# Patient Record
Sex: FEMALE | Race: WHITE
[De-identification: names, ages, dates, MRNs, and addresses within clinical notes are randomized per-mention and may not be internally consistent; named-entity substitution may affect disease eponyms.]

---

## 2018-03-20 ENCOUNTER — HOSPITAL ENCOUNTER (EMERGENCY)
Dept: HOSPITAL 24 - ER | Age: 33
Discharge: HOME | End: 2018-03-20
Payer: COMMERCIAL

## 2018-03-20 VITALS — BODY MASS INDEX: 27.8 KG/M2

## 2018-03-20 VITALS — SYSTOLIC BLOOD PRESSURE: 135 MMHG | DIASTOLIC BLOOD PRESSURE: 78 MMHG

## 2018-03-20 DIAGNOSIS — S20.211A: Primary | ICD-10-CM

## 2018-03-20 DIAGNOSIS — M19.90: ICD-10-CM
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
## 2018-03-20 DIAGNOSIS — M50.30: ICD-10-CM

## 2018-03-20 LAB
BASOPHILS # BLD AUTO: 0.1 X10^3/UL (ref 0–0.1)
BASOPHILS NFR BLD AUTO: 0.9 % (ref 0.2–1)
BUN SERPL-MCNC: 21 MG/DL (ref 7–18)
CALCIUM ALBUM COR SERPL-SCNC: (no result) MMOL/L (ref 136–145)
CALCIUM SERPL-MCNC: 8.8 MG/DL (ref 8.5–10.1)
CHLORIDE SERPL-SCNC: 104 MMOL/L (ref 98–107)
CO2 SERPL-SCNC: 28.6 MMOL/L (ref 21–32)
CREAT SERPL-MCNC: 0.88 MG/DL (ref 0.55–1.02)
EGFR  BLACK RACES: > 60 (ref 60–?)
EOSINOPHIL # BLD AUTO: 0.4 X10^3/UL (ref 0–0.2)
EOSINOPHIL NFR BLD AUTO: 3.1 % (ref 0.9–2.9)
ERYTHROCYTE [DISTWIDTH] IN BLOOD BY AUTOMATED COUNT: 12.3 % (ref 11.6–16.5)
HCT VFR BLD AUTO: 41.2 % (ref 36–47)
HGB BLD-MCNC: 14.1 G/DL (ref 12–16)
LYMPHOCYTES # BLD AUTO: 3.5 X10^3/UL (ref 1.3–2.9)
LYMPHOCYTES NFR BLD AUTO: 26.8 % (ref 21–51)
MCH RBC QN AUTO: 31 PG (ref 27–34)
MCHC RBC AUTO-ENTMCNC: 34.3 G/DL (ref 33–35)
MCV RBC AUTO: 90.2 FL (ref 80–100)
MONOCYTES # BLD AUTO: 0.9 X10^3/UL (ref 0.3–0.8)
MONOCYTES NFR BLD AUTO: 6.5 % (ref 0–13)
NEUTROPHILS # BLD AUTO: 8.3 X10^3/UL (ref 2.2–4.8)
NEUTROPHILS NFR BLD AUTO: 62.7 % (ref 42–75)
PLATELET # BLD: 328 X10^3/UL (ref 150–450)
PMV BLD AUTO: 7.4 FL (ref 7.4–11)
RBC # BLD AUTO: 4.56 X10^6/UL (ref 3.5–5.4)
SODIUM SERPL-SCNC: 142 MMOL/L (ref 136–145)
WBC NRBC COR # BLD AUTO: 13.2 X10^3/UL (ref 3.6–10)

## 2018-03-20 PROCEDURE — 85025 COMPLETE CBC W/AUTO DIFF WBC: CPT

## 2018-03-20 PROCEDURE — 99283 EMERGENCY DEPT VISIT LOW MDM: CPT

## 2018-03-20 PROCEDURE — 74177 CT ABD & PELVIS W/CONTRAST: CPT

## 2018-03-20 PROCEDURE — 96365 THER/PROPH/DIAG IV INF INIT: CPT

## 2018-03-20 PROCEDURE — 96374 THER/PROPH/DIAG INJ IV PUSH: CPT

## 2018-03-20 PROCEDURE — 36415 COLL VENOUS BLD VENIPUNCTURE: CPT

## 2018-03-20 PROCEDURE — 80048 BASIC METABOLIC PNL TOTAL CA: CPT

## 2018-03-20 PROCEDURE — 96375 TX/PRO/DX INJ NEW DRUG ADDON: CPT

## 2018-03-20 PROCEDURE — 96367 TX/PROPH/DG ADDL SEQ IV INF: CPT

## 2018-03-20 PROCEDURE — 72125 CT NECK SPINE W/O DYE: CPT

## 2018-03-20 NOTE — DR.URINEF
HPI





- Time Seen


Time seen: 11:40





- PCP


Primary Care Physician: MOISÉS ROMANO





- Complaint


Chief Complaint Doctors Comments: Patient fell onto a tool box injured her 

right side. She has been complaining of right side pain and dysuria.


Chief Complaint:: PT C/O FALLING OFF OF A RIP STICK ON 03/19/18 AND SHE C/O 

PAIN TO HER NECK AND RIGHT SHOULDER AND SHE NOTICED BLOOD IN HER UA.. AND THAT 

SHE WENT TO HER MD TODAY AND THEY TOLD HER TO COME HERE..





- Source


History Provided: Patient





- Mode of Arrival


Mode of Arrival: Ambulatory





- Timing


Onset of Chief Complaint: 03/19/18





PMH





- PMH


Past Medical History: Yes


Past Medical History: Hypertension


Past Medical History Comment: > HR


Past Surgical History: Yes


Surgical History: Hysterectomy





- Family History


History of Family Medical Conditions: No


Family Medical History: Diabetes Mellitus, Coronary Artery Disease, Hypertension





- Social History


Does patient currently use any type of tobacco product: Yes


Have you used tobacco products in the last 12 months: Yes


Type of Tobacco Use: Cigarettes


How many years tobacco product used: 1


Does any household member use tobacco: No


Alcohol Use: None


Do you use any recreational Drugs:: No


Lives With: Family


Lives Where: Home





- infectious screening


In the last 2 months have you had wt loss of >10#?: NO


Have you had fever, night sweats or hemotysis?: No


Have you traveled outside the country in the last 6 months?: No


Isolation: Standard





ROS





- Review of Systems


Constitutional: No Symptoms Reported


Eyes: No Symptoms Reported


ENTM: No Symptoms Reported


Respiratoy: No Symptoms Reported


Cardiovascular: No Symptoms Reported


Gastrointestinal/Abdominal: No Symptoms Reported


Genitourinary: No Symptoms Reported


Neurological: No Symptoms Reported


Musculoskeletal: Muscle Pain (right flank pan)


Integumentary: No Symptoms Reported


Hematologic/Lymphatic: No Symptoms Reported


Endocrine: No Symptoms Reported


Psychiatric: No Symptoms Reported


All Other Systems: Reviewed and Negative





PE





- Vital Signs


Vitals: 


 





Temperature                      98.2 F


Pulse Rate                       85


Respiratory Rate                 20


Blood Pressure                   135/78


O2 Sat by Pulse Oximetry         100











- General


General Appearance: Alert, In No Apparent Distress





- Head


Head Exam: Normal Inspection, Atraumatic





- Eyes


Eye exam: Normal Appearance, PERRL, EOMI





- ENT


ENT Exam: Normal Exam





- Neck


Neck Exam: Normal Inspection, Full ROM





- Chest


Chest Inspection: Normal Inspection





- Respiratory


Respiratory Exam: Normal Lung Sounds Bilat


Respiratory Exam: Bilateral Clear to Auscultation





- Cardiovascular


Cardiovascular Exam: Regular Rate, Normal Rhythm





- Abdominal Exam


Abdominal Exam: Normal Inspection


Abdominal Tenderness: negative: RUQ, RLQ, LUQ, LLQ, Epigastrium, Suprapubic, 

Diffuse, Mild, Moderate, Severe, Other





- Rectal


Rectal Exam: Deferred





- Genitourinary


External  Exam: Female: Deferred


: Speculum Exam  (Female): Deferred


: Bimanual Exam (female): Deferred





- Extremities


Extremities Exam: Normal Inspection, Full ROM





- Back


Back Exam: Normal Inspection, Full ROM





- Neurologic


Neurological Exam: Alert, Oriented X3, CN II-XII Intact





- Psychiatric


Psychiatric Exam: Normal Affect





- Skin


Skin Exam: Warm, Dry, Intact





ROR





- Labs Reviewed


Result Diagrams: 


 03/20/18 11:57





 03/20/18 11:57


Laboratory: 


 











WBC  13.2 X10^3/uL (3.6-10.0)  H  03/20/18  11:57    


 


RBC  4.56 X10^6/uL (3.5-5.4)   03/20/18  11:57    


 


Hgb  14.1 g/dL (12.0-16.0)   03/20/18  11:57    


 


Hct  41.2 % (36.0-47.0)   03/20/18  11:57    


 


MCV  90.2 fL (80.0-100.0)   03/20/18  11:57    


 


MCH  31.0 pg (27.0-34.0)   03/20/18  11:57    


 


MCHC  34.3 g/dL (33.0-35.0)   03/20/18  11:57    


 


RDW  12.3 % (11.6-16.5)   03/20/18  11:57    


 


Plt Count  328 X10^3/uL (150.0-450.0)   03/20/18  11:57    


 


MPV  7.4 fL (7.4-11.0)   03/20/18  11:57    


 


Neut % (Auto)  62.7 % (42.0-75.0)   03/20/18  11:57    


 


Lymph % (Auto)  26.8 % (21.0-51.0)   03/20/18  11:57    


 


Mono % (Auto)  6.5 % (0.0-13.0)   03/20/18  11:57    


 


Eos % (Auto)  3.1 % (0.9-2.9)  H  03/20/18  11:57    


 


Baso % (Auto)  0.9 % (0.2-1.0)   03/20/18  11:57    


 


Neut # (Auto)  8.3 x10^3/uL (2.2-4.8)  H  03/20/18  11:57    


 


Lymph # (Auto)  3.5 X10^3/uL (1.3-2.9)  H  03/20/18  11:57    


 


Mono # (Auto)  0.9 x10^3/uL (0.3-0.8)  H  03/20/18  11:57    


 


Eos # (Auto)  0.4 x10^3/uL (0.0-0.2)  H  03/20/18  11:57    


 


Baso # (Auto)  0.1 X10^3/uL (0.0-0.1)   03/20/18  11:57    


 


Absolute Nucleated RBC  0.1 /100WBC  03/20/18  11:57    


 


Sodium  142 mmol/L (136-145)   03/20/18  11:57    


 


Corrected Sodium  TNP   03/20/18  11:57    


 


Potassium  4.0 mmol/L (3.5-5.1)   03/20/18  11:57    


 


Chloride  104 mmol/L ()   03/20/18  11:57    


 


Carbon Dioxide  28.6 mmol/L (21-32)   03/20/18  11:57    


 


BUN  21 mg/dL (7-18)  H  03/20/18  11:57    


 


Creatinine  0.88 mg/dL (0.55-1.02)   03/20/18  11:57    


 


Est GFR (MDRD) Af Amer  > 60  (>60)   03/20/18  11:57    


 


Est GFR (MDRD) Non-Af  > 60  (>60)   03/20/18  11:57    


 


Glucose  89 mg/dL (65-99)   03/20/18  11:57    


 


Calcium  8.8 mg/dL (8.5-10.1)   03/20/18  11:57    














- XRAY


XRAY Interpreted by: Radiologist (CT Cervical Spine: There is normal alignment 

without fracture or compression.  There are mild osteophytes diffusely about 

the cervical facet joints.  The spinous processes are intact.  There is mild 

anterior osteophyte formation at C6-7.  The visualized lung apices are clear.  

The soft tissues appear unremarkable. Impression: Mild facet joint 

osteoarthritis and C6-7 DJD. No fracture CT Abd/Pel w/o: There is elevation of 

the right hemidiaphragm.  There is no plelural effusion.  The liver and spleen 

and pancreas and kidneys are unremarkable without contusion or laceration or 

mass.  There is no hydronephrosis.  There is no free fluid or free air.  The 

appendix is normal. There is no abnormal bowel distention or bowel wall 

thickening.  The uterus is apparently absent.  There is no adnexal mass.  No 

bony abnormality is demonstrated.  The urinary bladder is unremarkable. The 

gallbladderr is unremarkable.  Impression: Negative, no acute disease 

demonstrated.)





- Diagnosis


Discharge Problem: 


 DJD (degenerative joint disease), cervical





Contusion of rib on right side


Qualifiers:


 Encounter type: initial encounter Qualified Code(s): S20.211A - Contusion of 

right front wall of thorax, initial encounter





Osteoarthritis


Qualifiers:


 Osteoarthritis location: unspecified site Osteoarthritis type: unspecified 

Qualified Code(s): M19.90 - Unspecified osteoarthritis, unspecified site








- Discharge Plan


Condition: Stable





- Follow ups/Referrals


Follow ups/Referrals: 


MOISÉS ROMANO [Primary Care Provider] - 3 days





- Instructions

## 2018-03-20 NOTE — CT
History: Fall yesterday with flank pain and hematuria



Study: CT abdomen and pelvis without contrast. Sagittal and coronal reformations were provided.



Comparison: None



Findings: The visualized lung bases are grossly clear. There is elevation of the right hemidiaphragm.
 There is no pleural effusion. The liver and spleen and pancreas and kidneys are unremarkable without
 contusion or laceration or mass. There is no hydronephrosis. There is no free fluid or free air. The
 appendix is normal. There is no abnormal bowel distention or bowel wall thickening. The uterus is ap
parently absent. There is no adnexal mass. No bony abnormality is demonstrated. The urinary bladder i
s unremarkable. The gallbladder is unremarkable.



Impression:



Negative, no acute disease demonstrated.



Reported By:Electronically Signed by FERNANDO RENTERIA MD at 3/20/2018 1:14:02 PM

## 2018-03-20 NOTE — CT
History: Fall yesterday on pole box with neck and shoulder pain



Study: CT cervical spine without contrast. Sagittal and coronal reformations were provided.



Findings: There is normal alignment without fracture or compression. There are mild osteophytes diffu
sely about the cervical facet joints. The spinous processes are intact. There is mild anterior osteop
hyte formation at C6-7. The visualized lung apices are clear. The soft tissues appear unremarkable.



Impression: Mild facet joint osteoarthritis and C6-7 degenerative disc disease. No fracture demonstra
tom.



Reported By:Electronically Signed by FERNANDO RENTERIA MD at 3/20/2018 1:11:02 PM